# Patient Record
Sex: MALE | Race: BLACK OR AFRICAN AMERICAN | NOT HISPANIC OR LATINO | URBAN - METROPOLITAN AREA
[De-identification: names, ages, dates, MRNs, and addresses within clinical notes are randomized per-mention and may not be internally consistent; named-entity substitution may affect disease eponyms.]

---

## 2018-06-11 ENCOUNTER — OFFICE (OUTPATIENT)
Dept: URBAN - METROPOLITAN AREA CLINIC 14 | Facility: CLINIC | Age: 73
End: 2018-06-11
Payer: COMMERCIAL

## 2018-06-11 VITALS — DIASTOLIC BLOOD PRESSURE: 98 MMHG | HEIGHT: 74 IN | SYSTOLIC BLOOD PRESSURE: 170 MMHG | WEIGHT: 202 LBS

## 2018-06-11 DIAGNOSIS — K59.00 CONSTIPATION, UNSPECIFIED: ICD-10-CM

## 2018-06-11 DIAGNOSIS — K21.9 GASTRO-ESOPHAGEAL REFLUX DISEASE WITHOUT ESOPHAGITIS: ICD-10-CM

## 2018-06-11 DIAGNOSIS — I10 ESSENTIAL (PRIMARY) HYPERTENSION: ICD-10-CM

## 2018-06-11 PROCEDURE — 99215 OFFICE O/P EST HI 40 MIN: CPT | Performed by: NURSE PRACTITIONER

## 2018-06-11 RX ORDER — LINACLOTIDE 145 UG/1
145 CAPSULE, GELATIN COATED ORAL
Qty: 30 | Refills: 5 | Status: COMPLETED
Start: 2018-06-11 | End: 2018-10-29

## 2018-07-26 ENCOUNTER — OFFICE (OUTPATIENT)
Dept: URBAN - METROPOLITAN AREA CLINIC 14 | Facility: CLINIC | Age: 73
End: 2018-07-26
Payer: COMMERCIAL

## 2018-07-26 VITALS
DIASTOLIC BLOOD PRESSURE: 85 MMHG | HEIGHT: 74 IN | WEIGHT: 201 LBS | SYSTOLIC BLOOD PRESSURE: 140 MMHG | HEART RATE: 65 BPM

## 2018-07-26 DIAGNOSIS — K59.00 CONSTIPATION, UNSPECIFIED: ICD-10-CM

## 2018-07-26 PROCEDURE — 99213 OFFICE O/P EST LOW 20 MIN: CPT | Performed by: INTERNAL MEDICINE

## 2018-10-29 ENCOUNTER — OFFICE (OUTPATIENT)
Dept: URBAN - METROPOLITAN AREA CLINIC 14 | Facility: CLINIC | Age: 73
End: 2018-10-29
Payer: COMMERCIAL

## 2018-10-29 VITALS
WEIGHT: 197 LBS | HEIGHT: 74 IN | SYSTOLIC BLOOD PRESSURE: 133 MMHG | DIASTOLIC BLOOD PRESSURE: 77 MMHG | HEART RATE: 57 BPM

## 2018-10-29 DIAGNOSIS — K21.9 GASTRO-ESOPHAGEAL REFLUX DISEASE WITHOUT ESOPHAGITIS: ICD-10-CM

## 2018-10-29 DIAGNOSIS — K59.00 CONSTIPATION, UNSPECIFIED: ICD-10-CM

## 2018-10-29 PROCEDURE — 99212 OFFICE O/P EST SF 10 MIN: CPT | Performed by: INTERNAL MEDICINE

## 2020-05-20 ENCOUNTER — OFFICE (OUTPATIENT)
Dept: URBAN - METROPOLITAN AREA CLINIC 14 | Facility: CLINIC | Age: 75
End: 2020-05-20
Payer: COMMERCIAL

## 2020-05-20 VITALS
RESPIRATION RATE: 16 BRPM | HEART RATE: 62 BPM | HEIGHT: 74 IN | WEIGHT: 180 LBS | DIASTOLIC BLOOD PRESSURE: 81 MMHG | SYSTOLIC BLOOD PRESSURE: 139 MMHG

## 2020-05-20 DIAGNOSIS — K59.00 CONSTIPATION, UNSPECIFIED: ICD-10-CM

## 2020-05-20 DIAGNOSIS — K21.9 GASTRO-ESOPHAGEAL REFLUX DISEASE WITHOUT ESOPHAGITIS: ICD-10-CM

## 2020-05-20 DIAGNOSIS — K64.8 OTHER HEMORRHOIDS: ICD-10-CM

## 2020-05-20 PROCEDURE — 99214 OFFICE O/P EST MOD 30 MIN: CPT | Performed by: INTERNAL MEDICINE

## 2020-05-20 RX ORDER — OMEPRAZOLE 40 MG/1
CAPSULE, DELAYED RELEASE ORAL
Qty: 60 | Refills: 3 | Status: ACTIVE
Start: 2020-05-20

## 2020-05-20 NOTE — SERVICEHPINOTES
Jovanny Avalos   is a   75   year old  male   here today for follow-up due to rectal pain. He has been experiencing intermittent rectal pain and hemorrhoidal swelling for over a year.  His symptoms had been usually responsive to preparation H until the last week 1 day became more persistent.  Most of the pain is well is trying to have a bowel movement though he can continue to experience itching and discomfort for a while later.  He denies any abdominal pain. He has seen occasionally small amount of blood on the tissue but not in the commode or mixed with the stool.  He has had chronic constipation which usually responds well to milk of magnesia which she takes maybe twice a week.  He had tried to go off without having to take antacids but his reflux symptoms have worsened recently.  He denies any dysphagia.  In the past he had good results with omeprazole.

## 2020-06-04 VITALS — HEIGHT: 74 IN

## 2020-06-11 ENCOUNTER — OFFICE (OUTPATIENT)
Dept: URBAN - METROPOLITAN AREA TELEHEALTH 10 | Facility: TELEHEALTH | Age: 75
End: 2020-06-11
Payer: COMMERCIAL

## 2020-06-11 DIAGNOSIS — K64.8 OTHER HEMORRHOIDS: ICD-10-CM

## 2020-06-11 DIAGNOSIS — K21.9 GASTRO-ESOPHAGEAL REFLUX DISEASE WITHOUT ESOPHAGITIS: ICD-10-CM

## 2020-06-11 DIAGNOSIS — K59.00 CONSTIPATION, UNSPECIFIED: ICD-10-CM

## 2020-06-11 RX ORDER — OMEPRAZOLE 40 MG/1
CAPSULE, DELAYED RELEASE ORAL
Qty: 60 | Refills: 3 | Status: ACTIVE
Start: 2020-05-20

## 2020-06-11 NOTE — SERVICEHPINOTES
Jovanny Avalos   is a   75   year old  male   here today for tele health follow-up due to symptomatic hemorrhoids and due to chronic reflux.  He states that his hemorrhoidal symptoms have actually resolved.  His chronic constipation is well controlled as long as he takes milk of magnesium as needed.  He has not had any rectal bleeding.Unfortunately his reflux symptoms have persisted.  This persisted despite taking omeprazole daily.  He also has raise the head of his bed.  He does acknowledge that he sometimes still eats late in the evening.  He does sometimes chokes on water but denies any typical dysphagia.  He denies exertional dyspnea or exertional chest pain. He denies weight loss.

## 2020-06-23 ENCOUNTER — OFFICE (OUTPATIENT)
Dept: URBAN - METROPOLITAN AREA PATHOLOGY 22 | Facility: PATHOLOGY | Age: 75
End: 2020-06-23
Payer: COMMERCIAL

## 2020-06-23 ENCOUNTER — AMBULATORY SURGICAL CENTER (OUTPATIENT)
Dept: URBAN - METROPOLITAN AREA SURGERY 2 | Facility: SURGERY | Age: 75
End: 2020-06-23
Payer: COMMERCIAL

## 2020-06-23 VITALS
DIASTOLIC BLOOD PRESSURE: 62 MMHG | HEART RATE: 55 BPM | DIASTOLIC BLOOD PRESSURE: 77 MMHG | RESPIRATION RATE: 15 BRPM | RESPIRATION RATE: 18 BRPM | HEART RATE: 50 BPM | DIASTOLIC BLOOD PRESSURE: 62 MMHG | SYSTOLIC BLOOD PRESSURE: 152 MMHG | HEART RATE: 51 BPM | DIASTOLIC BLOOD PRESSURE: 77 MMHG | OXYGEN SATURATION: 99 % | TEMPERATURE: 98 F | SYSTOLIC BLOOD PRESSURE: 154 MMHG | HEART RATE: 48 BPM | TEMPERATURE: 98 F | HEART RATE: 55 BPM | OXYGEN SATURATION: 100 % | HEART RATE: 52 BPM | TEMPERATURE: 98 F | OXYGEN SATURATION: 99 % | HEART RATE: 51 BPM | DIASTOLIC BLOOD PRESSURE: 64 MMHG | SYSTOLIC BLOOD PRESSURE: 140 MMHG | DIASTOLIC BLOOD PRESSURE: 61 MMHG | RESPIRATION RATE: 17 BRPM | DIASTOLIC BLOOD PRESSURE: 65 MMHG | RESPIRATION RATE: 18 BRPM | RESPIRATION RATE: 17 BRPM | DIASTOLIC BLOOD PRESSURE: 64 MMHG | WEIGHT: 184 LBS | RESPIRATION RATE: 15 BRPM | RESPIRATION RATE: 15 BRPM | HEART RATE: 50 BPM | OXYGEN SATURATION: 100 % | HEART RATE: 51 BPM | WEIGHT: 184 LBS | HEART RATE: 52 BPM | HEART RATE: 55 BPM | DIASTOLIC BLOOD PRESSURE: 61 MMHG | HEART RATE: 48 BPM | SYSTOLIC BLOOD PRESSURE: 113 MMHG | SYSTOLIC BLOOD PRESSURE: 152 MMHG | HEIGHT: 74 IN | DIASTOLIC BLOOD PRESSURE: 65 MMHG | SYSTOLIC BLOOD PRESSURE: 113 MMHG | HEART RATE: 52 BPM | SYSTOLIC BLOOD PRESSURE: 149 MMHG | HEIGHT: 74 IN | SYSTOLIC BLOOD PRESSURE: 149 MMHG | SYSTOLIC BLOOD PRESSURE: 113 MMHG | DIASTOLIC BLOOD PRESSURE: 65 MMHG | SYSTOLIC BLOOD PRESSURE: 154 MMHG | HEIGHT: 74 IN | SYSTOLIC BLOOD PRESSURE: 149 MMHG | OXYGEN SATURATION: 99 % | SYSTOLIC BLOOD PRESSURE: 140 MMHG | SYSTOLIC BLOOD PRESSURE: 152 MMHG | DIASTOLIC BLOOD PRESSURE: 62 MMHG | HEART RATE: 50 BPM | DIASTOLIC BLOOD PRESSURE: 61 MMHG | RESPIRATION RATE: 18 BRPM | DIASTOLIC BLOOD PRESSURE: 64 MMHG | OXYGEN SATURATION: 100 % | DIASTOLIC BLOOD PRESSURE: 77 MMHG | SYSTOLIC BLOOD PRESSURE: 154 MMHG | HEART RATE: 48 BPM | SYSTOLIC BLOOD PRESSURE: 140 MMHG | RESPIRATION RATE: 17 BRPM | WEIGHT: 184 LBS

## 2020-06-23 DIAGNOSIS — K44.9 DIAPHRAGMATIC HERNIA WITHOUT OBSTRUCTION OR GANGRENE: ICD-10-CM

## 2020-06-23 DIAGNOSIS — K22.2 ESOPHAGEAL OBSTRUCTION: ICD-10-CM

## 2020-06-23 DIAGNOSIS — K31.89 OTHER DISEASES OF STOMACH AND DUODENUM: ICD-10-CM

## 2020-06-23 DIAGNOSIS — K21.9 GASTRO-ESOPHAGEAL REFLUX DISEASE WITHOUT ESOPHAGITIS: ICD-10-CM

## 2020-06-23 PROCEDURE — 43239 EGD BIOPSY SINGLE/MULTIPLE: CPT | Mod: 59 | Performed by: INTERNAL MEDICINE

## 2020-06-23 PROCEDURE — 88313 SPECIAL STAINS GROUP 2: CPT | Performed by: INTERNAL MEDICINE

## 2020-06-23 PROCEDURE — 43248 EGD GUIDE WIRE INSERTION: CPT | Performed by: INTERNAL MEDICINE

## 2020-06-23 PROCEDURE — G8907 PT DOC NO EVENTS ON DISCHARG: HCPCS | Performed by: INTERNAL MEDICINE

## 2020-06-23 PROCEDURE — G8918 PT W/O PREOP ORDER IV AB PRO: HCPCS | Performed by: INTERNAL MEDICINE

## 2020-06-23 PROCEDURE — 88342 IMHCHEM/IMCYTCHM 1ST ANTB: CPT | Performed by: INTERNAL MEDICINE

## 2020-06-23 PROCEDURE — 88305 TISSUE EXAM BY PATHOLOGIST: CPT | Performed by: INTERNAL MEDICINE

## 2020-06-25 VITALS — HEIGHT: 74 IN

## 2020-08-13 ENCOUNTER — OFFICE (OUTPATIENT)
Dept: URBAN - METROPOLITAN AREA TELEHEALTH 10 | Facility: TELEHEALTH | Age: 75
End: 2020-08-13
Payer: COMMERCIAL

## 2020-08-13 DIAGNOSIS — K59.00 CONSTIPATION, UNSPECIFIED: ICD-10-CM

## 2020-08-13 DIAGNOSIS — R13.10 DYSPHAGIA, UNSPECIFIED: ICD-10-CM

## 2020-08-13 DIAGNOSIS — K21.9 GASTRO-ESOPHAGEAL REFLUX DISEASE WITHOUT ESOPHAGITIS: ICD-10-CM

## 2020-08-13 RX ORDER — OMEPRAZOLE 40 MG/1
CAPSULE, DELAYED RELEASE ORAL
Qty: 60 | Refills: 3 | Status: ACTIVE
Start: 2020-05-20

## 2020-08-13 NOTE — SERVICEHPINOTES
Jovanny Avalos   is a   75   year old  male   here today for follow-up predominantly due to chronic reflux.  He states that he has continued to experience frequent reflux symptoms including nocturnal symptoms.  This has persisted despite being on the omeprazole 40 mg daily.  He does acknowledge sometimes eating late in the evening and not raising the head of the bed.  His dysphagia has resolved since his EGD and dilatation.  He has continued to avoid NSAIDs.His constipation is better with dietary changes and with adding Ensure.  He has not had as much issues with straining or with his hemorrhoids.  He otherwise denies any new complaints.

## 2020-11-13 VITALS — HEIGHT: 74 IN

## 2020-11-16 ENCOUNTER — OFFICE (OUTPATIENT)
Dept: URBAN - METROPOLITAN AREA TELEHEALTH 10 | Facility: TELEHEALTH | Age: 75
End: 2020-11-16
Payer: COMMERCIAL

## 2020-11-16 DIAGNOSIS — K59.00 CONSTIPATION, UNSPECIFIED: ICD-10-CM

## 2020-11-16 DIAGNOSIS — K21.9 GASTRO-ESOPHAGEAL REFLUX DISEASE WITHOUT ESOPHAGITIS: ICD-10-CM

## 2020-11-16 DIAGNOSIS — R13.10 DYSPHAGIA, UNSPECIFIED: ICD-10-CM

## 2020-11-16 NOTE — SERVICEHPINOTES
Jovanny Avalos   is a   75   year old  male   here today for follow-up due to chronic reflux.  He states that his symptoms have improved with increasing his omeprazole to twice daily.  His cough is better.  His dysphagia is better.  He continues to smoke tobacco but has cut back.  He still has not raise the head of the bed.  Of note his chronic constipation also has been better lately.  He has no new GI complaints.

## 2021-05-17 ENCOUNTER — OFFICE (OUTPATIENT)
Dept: URBAN - METROPOLITAN AREA CLINIC 14 | Facility: CLINIC | Age: 76
End: 2021-05-17
Payer: COMMERCIAL

## 2021-05-17 VITALS
DIASTOLIC BLOOD PRESSURE: 98 MMHG | WEIGHT: 179 LBS | HEART RATE: 65 BPM | RESPIRATION RATE: 18 BRPM | OXYGEN SATURATION: 97 % | HEIGHT: 74 IN | SYSTOLIC BLOOD PRESSURE: 164 MMHG

## 2021-05-17 DIAGNOSIS — K21.9 GASTRO-ESOPHAGEAL REFLUX DISEASE WITHOUT ESOPHAGITIS: ICD-10-CM

## 2021-05-17 DIAGNOSIS — R13.10 DYSPHAGIA, UNSPECIFIED: ICD-10-CM

## 2021-05-17 PROCEDURE — 99214 OFFICE O/P EST MOD 30 MIN: CPT | Performed by: INTERNAL MEDICINE

## 2021-05-17 RX ORDER — POLYETHYLENE GLYCOL 3350, SODIUM SULFATE, SODIUM CHLORIDE, POTASSIUM CHLORIDE, ASCORBIC ACID, SODIUM ASCORBATE 140-9-5.2G
KIT ORAL
Qty: 1 | Refills: 0 | Status: COMPLETED
Start: 2021-05-17 | End: 2021-06-03

## 2021-06-03 ENCOUNTER — AMBULATORY SURGICAL CENTER (OUTPATIENT)
Dept: URBAN - METROPOLITAN AREA SURGERY 2 | Facility: SURGERY | Age: 76
End: 2021-06-03
Payer: COMMERCIAL

## 2021-06-03 ENCOUNTER — OFFICE (OUTPATIENT)
Dept: URBAN - METROPOLITAN AREA PATHOLOGY 22 | Facility: PATHOLOGY | Age: 76
End: 2021-06-03
Payer: COMMERCIAL

## 2021-06-03 VITALS
HEIGHT: 74 IN | DIASTOLIC BLOOD PRESSURE: 91 MMHG | DIASTOLIC BLOOD PRESSURE: 77 MMHG | HEART RATE: 81 BPM | DIASTOLIC BLOOD PRESSURE: 62 MMHG | DIASTOLIC BLOOD PRESSURE: 77 MMHG | DIASTOLIC BLOOD PRESSURE: 83 MMHG | DIASTOLIC BLOOD PRESSURE: 83 MMHG | RESPIRATION RATE: 16 BRPM | SYSTOLIC BLOOD PRESSURE: 129 MMHG | HEART RATE: 61 BPM | DIASTOLIC BLOOD PRESSURE: 64 MMHG | DIASTOLIC BLOOD PRESSURE: 77 MMHG | TEMPERATURE: 97.4 F | RESPIRATION RATE: 18 BRPM | HEIGHT: 74 IN | HEART RATE: 61 BPM | OXYGEN SATURATION: 98 % | SYSTOLIC BLOOD PRESSURE: 146 MMHG | DIASTOLIC BLOOD PRESSURE: 64 MMHG | DIASTOLIC BLOOD PRESSURE: 62 MMHG | SYSTOLIC BLOOD PRESSURE: 146 MMHG | SYSTOLIC BLOOD PRESSURE: 129 MMHG | SYSTOLIC BLOOD PRESSURE: 142 MMHG | DIASTOLIC BLOOD PRESSURE: 91 MMHG | HEART RATE: 63 BPM | SYSTOLIC BLOOD PRESSURE: 127 MMHG | SYSTOLIC BLOOD PRESSURE: 146 MMHG | HEART RATE: 81 BPM | HEART RATE: 81 BPM | RESPIRATION RATE: 16 BRPM | HEART RATE: 63 BPM | HEART RATE: 63 BPM | OXYGEN SATURATION: 96 % | TEMPERATURE: 97.4 F | OXYGEN SATURATION: 96 % | OXYGEN SATURATION: 98 % | RESPIRATION RATE: 16 BRPM | SYSTOLIC BLOOD PRESSURE: 142 MMHG | SYSTOLIC BLOOD PRESSURE: 130 MMHG | OXYGEN SATURATION: 96 % | TEMPERATURE: 97 F | DIASTOLIC BLOOD PRESSURE: 64 MMHG | DIASTOLIC BLOOD PRESSURE: 91 MMHG | HEART RATE: 61 BPM | SYSTOLIC BLOOD PRESSURE: 127 MMHG | HEIGHT: 74 IN | SYSTOLIC BLOOD PRESSURE: 130 MMHG | RESPIRATION RATE: 18 BRPM | TEMPERATURE: 97.4 F | SYSTOLIC BLOOD PRESSURE: 129 MMHG | TEMPERATURE: 97 F | DIASTOLIC BLOOD PRESSURE: 62 MMHG | OXYGEN SATURATION: 98 % | WEIGHT: 179 LBS | SYSTOLIC BLOOD PRESSURE: 130 MMHG | WEIGHT: 179 LBS | RESPIRATION RATE: 18 BRPM | DIASTOLIC BLOOD PRESSURE: 83 MMHG | SYSTOLIC BLOOD PRESSURE: 142 MMHG | TEMPERATURE: 97 F | SYSTOLIC BLOOD PRESSURE: 127 MMHG | WEIGHT: 179 LBS

## 2021-06-03 DIAGNOSIS — Z12.11 ENCOUNTER FOR SCREENING FOR MALIGNANT NEOPLASM OF COLON: ICD-10-CM

## 2021-06-03 DIAGNOSIS — D12.0 BENIGN NEOPLASM OF CECUM: ICD-10-CM

## 2021-06-03 PROBLEM — K63.5 POLYP OF COLON: Status: ACTIVE | Noted: 2021-06-03

## 2021-06-03 PROCEDURE — 45380 COLONOSCOPY AND BIOPSY: CPT | Mod: PT | Performed by: INTERNAL MEDICINE

## 2021-06-03 PROCEDURE — G8918 PT W/O PREOP ORDER IV AB PRO: HCPCS | Performed by: INTERNAL MEDICINE

## 2021-06-03 PROCEDURE — G8907 PT DOC NO EVENTS ON DISCHARG: HCPCS | Performed by: INTERNAL MEDICINE

## 2021-06-03 PROCEDURE — 88305 TISSUE EXAM BY PATHOLOGIST: CPT | Performed by: INTERNAL MEDICINE
